# Patient Record
Sex: MALE | Race: WHITE
[De-identification: names, ages, dates, MRNs, and addresses within clinical notes are randomized per-mention and may not be internally consistent; named-entity substitution may affect disease eponyms.]

---

## 2017-01-04 ENCOUNTER — HOSPITAL ENCOUNTER (EMERGENCY)
Dept: HOSPITAL 75 - ER | Age: 22
Discharge: HOME | End: 2017-01-04
Payer: SELF-PAY

## 2017-01-04 VITALS — DIASTOLIC BLOOD PRESSURE: 89 MMHG | SYSTOLIC BLOOD PRESSURE: 145 MMHG

## 2017-01-04 VITALS — HEIGHT: 68 IN | WEIGHT: 135 LBS | BODY MASS INDEX: 20.46 KG/M2
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
## 2017-01-04 DIAGNOSIS — S01.112A: Primary | ICD-10-CM

## 2017-01-04 PROCEDURE — 90715 TDAP VACCINE 7 YRS/> IM: CPT

## 2017-01-04 PROCEDURE — 90471 IMMUNIZATION ADMIN: CPT

## 2017-01-04 PROCEDURE — 12011 RPR F/E/E/N/L/M 2.5 CM/<: CPT

## 2017-01-04 NOTE — ED EENT
History of Present Illness


General


Chief Complaint:  Laceration


Stated Complaint:  LAC ABOVE EYE


Source:  patient


Exam Limitations:  no limitations





History of Present Illness


Time seen by provider:  20:31


Initial Comments


To ER with reports of laceration above the left eye.  States that he took his 

dog outside to go to the bathroom.  Dog was on a leash and the dog took off 

pulling him over.  He did strike the left side of his forehead on the ground.  

There was no loss of consciousness but there is a laceration to the left 

eyebrow.  States that he has had several drinks of vodka tonight.


Timing/Duration:  abrupt


Severity:  moderate


Associated Symptoms:   denies symptoms





Allergies and Home Medications


Allergies


Coded Allergies:  


     No Known Drug Allergies (Unverified , 12/21/14)





Home Medications


Hydrocodone Bit/Acetaminophen 1 Tab Tablet #14 1 TAB PO Q4H PRN PRN PAIN 


   Prescribed by: MONICO CORREA on 12/21/14 1936


Hydrocodone Bit/Acetaminophen 1 Tab Tablet #20 1-2 TAB PO Q6H PRN PRN PAIN 


   Prescribed by: MOSES ARCHER on 3/6/15 0021





Review of Systems


Constitutional:   see HPINo chills


Eyes:   See HPI


Ears:   No Symptoms Reported


Nose:   no symptoms reported


Mouth:   no symptoms reported


Throat:   no symptoms reported


Respiratory:   no symptoms reported


Cardiovascular:   no symptoms reported


Musculoskeletal:   no symptoms reported





Past Medical-Social-Family Hx


Patient Social History


Recent Foreign Travel:  No


Contact w/Someone Who Travel:  No





Surgeries


HX Surgeries:  No





Respiratory


Hx Respiratory Disorders:  No





Cardiovascular


Hx Cardiac Disorders:  No





Neurological


Hx Neurological Disorders:  No





Reproductive System


Hx Reproductive Disorders:  No


Sexually Transmitted Disease:  No





Genitourinary


Hx Genitourinary Disorders:  No





Gastrointestinal


Hx Gastrointestinal Disorders:  No





Musculoskeletal


Hx Musculoskeletal Disorders:  No





Endocrine


Hx Endocrine Disorders:  No





HEENT


HX ENT Disorders:  No





Cancer


Hx Cancer:  No





Psychosocial


Hx Psychiatric Problems:  No





Integumentary


HX Skin/Integumentary Disorder:  No





Blood Transfusions


Hx Blood Disorders:  No





Physical Exam


General Appearance:   WD/WN no apparent distress


Eyes:  left eye other (1 cm laceration to the lateral aspect of the left 

eyebrow.  There is a small abrasion to the left superior aspect of the 

forehead.  Next ocular muscles are intact and there is no subconjunctival 

hemorrhage or evidence of ocular injury.), bilateral eye EOMI, bilateral eye 

PERRL


Ears:  bilateral ear TM normal, bilateral ear auricle normal, bilateral ear 

canal normal


Mouth/Throat:   normal mouth inspection pharynx normal


Neck:   non-tender full range of motion


Respiratory:   normal breath sounds no respiratory distress no accessory muscle 

use


Neurologic/Psychiatric:   alert normal mood/affect oriented x 3


Skin:   normal color warm/dry


His GCS is 15.  He is talks nearly nonstop laughing, conversation is appropriate

, no distress.  Denies headache, denies vision changes denies nausea.  I did 

recommend a CT scan of the head given the alcohol use but he states that the 

cannot afford that and does not feel is necessary given that he has no symptoms 

such as headache nausea or vomiting.  He was then able to repeat the risks 

including "bleeding in or around the brain"back to me at the end of his visit 

and still insists that he does not want or need a CT scan.





Laceration Repair :  


   Wound Location:  Face


   Wound Length (cm):  1


   Wound's Depth, Shape:  sub Q


   Wound Explored:  clean


   Suture:  Prolene


   Suture Size:  5-0


   Number of Sutures:  4


   Layer Closure?:  1


   Number Deep Layer Sutures:  0


Progress


Area anesthetized with 1.5 mL of 1 percent lidocaine with epinephrine.  Wound 

then scrubbed with waxing/saline solution and irrigated with 20 mL of the same.

  Wound then closed with 4 simple interrupted sutures size 5-0 Prolene.





Progress/Results/Core Measures


Results/Orders


My Orders





 Orders-VIOLETTE NAJERA


Lidocaine/Epi 2% 1:100,000 (Xylocaine/Ep (1/4/17 20:15)








Departure


Impression


Impression:  


 Primary Impression:  


 Eyebrow laceration


 Qualified Code:  S01.112A - Laceration without foreign body of left eyelid and 

periocular area, initial encounter


 Additional Impression:  


 Alcohol use


Disposition:  01 HOME, SELF-CARE


Condition:  Stable





Departure-Patient Inst.


Decision time for Depature:  20:35


Referrals:  


NO,LOCAL PHYSICIAN (PCP)


Primary Care Physician


Patient Instructions:  Laceration Repair With Stitches (DC)





Add. Discharge Instructions:


Return to ER in 5-7 days to have the stitches removed at your convenience


Return to ER before then for any sign of head injury such as headache nausea 

vomiting or vision changes and we would need to pursue a CT scan at that point


All discharge instructions reviewed with patient and/or family. Voiced 

understanding.








VIOLETTE NAJERA Jan 4, 2017 20:35

## 2017-03-31 ENCOUNTER — HOSPITAL ENCOUNTER (EMERGENCY)
Dept: HOSPITAL 75 - ER | Age: 22
Discharge: LEFT BEFORE DISCHARGE | End: 2017-03-31
Payer: SELF-PAY

## 2017-03-31 VITALS — BODY MASS INDEX: 20.46 KG/M2 | WEIGHT: 135 LBS | HEIGHT: 68 IN

## 2017-03-31 VITALS — SYSTOLIC BLOOD PRESSURE: 140 MMHG | DIASTOLIC BLOOD PRESSURE: 105 MMHG

## 2017-03-31 DIAGNOSIS — R00.0: Primary | ICD-10-CM

## 2017-03-31 DIAGNOSIS — R11.2: Primary | ICD-10-CM

## 2017-03-31 DIAGNOSIS — Z53.21: ICD-10-CM

## 2017-03-31 DIAGNOSIS — R11.10: ICD-10-CM

## 2017-03-31 PROCEDURE — 99282 EMERGENCY DEPT VISIT SF MDM: CPT

## 2017-03-31 NOTE — ED CARDIAC GENERAL
History of Present Illness


General


Stated Complaint:  VOMITING


Source:  patient


Exam Limitations:  no limitations





History of Present Illness


Time seen by provider:  21:18


Initial Comments


To ER with reports of vomiting all day.  Denies abdominal pain currently or 

nausea and is in fact eating sunflower seeds currently.  Denies diarrhea.  

States that he feels much better now but he needs a school note because he 

missed class.


Severity:  moderate


NTG SL PTA:  No


ASA po PTA:  No


Associated Systoms:  Nausea/Vomiting





Allergies and Home Medications


Allergies


Coded Allergies:  


     No Known Drug Allergies (Unverified , 12/21/14)





Home Medications


No Active Prescriptions or Reported Meds





Review of Systems


Constitutional:  see HPI


EENTM:  No Symptoms Reported


Respiratory:  No Symptoms Reported


Cardiovascular:  No Symptoms Reported


Gastrointestinal:  See HPI, Denies Abdominal Pain, Denies Diarrhea, Nausea, 

Vomiting


Genitourinary:  No Symptoms Reported


Musculoskeletal:  no symptoms reported


Skin:  no symptoms reported


Psychiatric/Neurological:  No Symptoms Reported


Endocrine:  No Symptoms Reported





Past Medical-Social-Family Hx


Patient Social History


Drug of Choice:  MARIJUANA


Type Used:  Cigarettes


Recent Foreign Travel:  No


Contact w/Someone Who Travel:  No


Recent Hopitalizations:  No





Immunizations Up To Date


Tetanus Booster (TDap):  More than 5yrs





Surgeries


HX Surgeries:  No





Respiratory


Hx Respiratory Disorders:  No





Cardiovascular


Hx Cardiac Disorders:  No





Neurological


Hx Neurological Disorders:  No





Reproductive System


Hx Reproductive Disorders:  No


Sexually Transmitted Disease:  No





Genitourinary


Hx Genitourinary Disorders:  No





Gastrointestinal


Hx Gastrointestinal Disorders:  No





Musculoskeletal


Hx Musculoskeletal Disorders:  No





Endocrine


Hx Endocrine Disorders:  No





HEENT


HX ENT Disorders:  No





Cancer


Hx Cancer:  No





Psychosocial


Hx Psychiatric Problems:  No





Integumentary


HX Skin/Integumentary Disorder:  No





Blood Transfusions


Hx Blood Disorders:  No





Physical Exam


Vital Signs





Vital Sign - Last 12Hours








 3/31/17





 21:20


 


Temp 98.9


 


Pulse 144


 


Resp 18


 


B/P (MAP) 140/82


 


Pulse Ox 98


 


O2 Delivery Room Air





Capillary Refill :


General Appearance:  No Apparent Distress, WD/WN, Other (patient is tachycardic 

at a heart rate of 170.  Blood pressure 150/90.  Patient is alert and oriented, 

GCS 15.  He states he does not want an IV or blood draw, all he needs is a note 

for school.  He states that he feels fine and the only reason his heart rate is 

elevated is because he is anxious.  He has no chest pain or shortness of 

breath.  I did advise him of the potential consequences of ignoring this which 

be permanent disability or death.  Patient states "nah, I'm good.  I feel fine.

"  Mentation is normal and he will sign out AGAINST MEDICAL ADVICE and sign a 

refusal of treatment form.)


HEENT:  PERRL/EOMI, TMs Normal, Normal ENT Inspection


Neck:  Full Range of Motion, Normal Inspection


Respiratory:  No Accessory Muscle Use, No Respiratory Distress


Cardiovascular:  No Murmur, Tachycardia


Gastrointestinal:  Normal Bowel Sounds, Non Tender, Soft


Extremity:  Normal Capillary Refill, Normal Inspection


Neurologic/Psychiatric:  Alert, Oriented x3, No Motor/Sensory Deficits


Skin:  Normal Color, Warm/Dry





Laceration Repair :  


   Suture Size:  5-0





Progress/Results/Core Measures


Results/Orders


Vital Signs/I&O





Vital Sign - Last 12Hours








 3/31/17 3/31/17 3/31/17





 21:20 21:25 21:30


 


Temp 98.9  


 


Pulse 144 165 176





  180 





  176 


 


Resp 18  18


 


B/P (MAP) 140/82  


 


Pulse Ox 98  98


 


O2 Delivery Room Air  











Departure


Communication


Progress Notes


When I went back into the room prior to discharge to give the patient his note 

that stated he was here in the emergency room today and would need clearance 

from a physician prior to being released to go back to work or school patient 

states "Man Im getting nervous again" I then asked him why to which he replied 

"I heard that aquiles out there on the phone saying my last name and I thought he 

might be calling the police".  Patient's brother states "they don't need to 

call the police for anything".  While at the registration desk being discharged

, patient state to his brother "Man, I fucked up" but did not elaborate on this.





Impression


Impression:  


 Primary Impression:  


 Tachycardia


 Additional Impression:  


 Left against medical advice


Disposition:  07 AGAINST MEDICAL ADVICE


Condition:  Against Medical Advice





Departure-Patient Inst.


Referrals:  


[facility] (PCP/Family)


Primary Care Physician


Scripts


No Active Prescriptions or Reported Meds











VIOLETTE NAJERA Mar 31, 2017 21:22
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
## 2017-04-23 NOTE — XMS REPORT
Continuity of Care Document

 Created on: 2017



BETTY HUSTON

External Reference #: [id number]

: 1995

Sex: Male



Demographics







 Address  [address] [place]  [address]

 

 Home Phone  [phone]

 

 Preferred Language  Unknown

 

 Marital Status  Unknown

 

 Bahai Affiliation  Unknown

 

 Race  Unknown

 

 Ethnic Group  Unknown





Author







 Author  [facility]

 

 Address  Unknown

 

 Phone  Unavailable



                                                      



Allergies

                      





 Active                    Description                    Code                  
  Type                    Severity                    Reaction                  
  Onset                    Reported/Identified                    Relationship 
to Patient                    Clinical Status                

 

 Yes                    No Known Drug Allergies                    [id number]  
                  Drug Allergy                    Unknown                    N/
A                                         2014                           
                               



                                                                               
         



Medications

                                                                               
         



Problems

                      





 Date Dx Coded                    Attending                    Type            
        Code                    Diagnosis                    Diagnosed By      
          

 

 2010                                                              V01.84
                    MENINGOCOCCAL VACCINE                                     

 

 2010                                                              V06.5 
                   DT, TETANUS-DIPHTHERIA [Td] ,TDAP                           
          

 

 2010                    AMBER GONZALEZ HEBERT LISA                        
                 V01.84                    MENINGOCOCCAL VACCINE               
                      

 

 2010                    AMBER GONZALEZ HEBERT LISA                        
                 V06.5                    DT, TETANUS-DIPHTHERIA [Td] ,TDAP    
                                 

 

 2010                    AMBER GONZALEZ HEBERT LISA                        
                 V01.84                    MENINGOCOCCAL VACCINE               
                      

 

 2010                    AMBER GONZALEZ HEBERT LISA                        
                 V06.5                    DT, TETANUS-DIPHTHERIA [Td] ,TDAP    
                                 

 

 2010                    AMBER GONZALEZ HEBERT LISA                        
                 V01.84                    MENINGOCOCCAL VACCINE               
                      

 

 2010                    AMBER GONZALEZ HEBERT LISA                        
                 V06.5                    DT, TETANUS-DIPHTHERIA [Td] ,TDAP    
                                 

 

 2010                    CHRISE APRN, MARTIN A                          
               V01.84                    MENINGOCOCCAL VACCINE                 
                    

 

 2010                    CHRISE APRALVERTO, MARTIN A                          
               V06.5                    DT, TETANUS-DIPHTHERIA [Td] ,TDAP      
                               

 

 2010                    CHRISE APRN, MARTIN A                          
               V01.84                    MENINGOCOCCAL VACCINE                 
                    

 

 2010                    CHRISE APRALVERTO, MARTIN A                          
               V06.5                    DT, TETANUS-DIPHTHERIA [Td] ,TDAP      
                               

 

 2010                    AMBER GONZALEZ HEBERT LISA                        
                 V01.84                    MENINGOCOCCAL VACCINE               
                      

 

 2010                    AMBER GONZALEZ HEBERT LISA                        
                 V06.5                    DT, TETANUS-DIPHTHERIA [Td] ,TDAP    
                                 

 

 2010                    AMBER GONZALEZ HEBERT LISA                        
                 V01.84                    MENINGOCOCCAL VACCINE               
                      

 

 2010                    AMBER SOARESALVERTO, HEBERT GONZALEZ                        
                 V06.5                    DT, TETANUS-DIPHTHERIA [Td] ,TDAP    
                                 

 

 2010                    RUFUS APRN, FAWAD R                           
              V01.84                    MENINGOCOCCAL VACCINE                  
                   

 

 2010                    RUFUS APRN, FAWAD R                           
              V06.5                    DT, TETANUS-DIPHTHERIA [Td] ,TDAP       
                              

 

 10/25/2010                                                              313.81
                    CD OPPOSITIONAL DEFIANT                                     

 

 10/25/2010                                                              314.01
                    ADHD COMBINED                                     

 

 10/25/2010                    AMBER SOARESALVERTO, HEBERT GONZALEZ                        
                 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             
                        

 

 10/25/2010                    CLARK APRN, HEBERT GONZALEZ                        
                 314.01                    ADHD COMBINED                       
              

 

 10/25/2010                    CLARK APRN, HEBERT GONZALEZ                        
                 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             
                        

 

 10/25/2010                    AMBER SOARESN, HEBERT GONZALEZ                        
                 314.01                    ADHD COMBINED                       
              

 

 10/25/2010                    CLARK APRN, HEBERT GONZALEZ                        
                 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             
                        

 

 10/25/2010                    AMBER SOARESALVERTO HEBERT GONZALEZ                        
                 314.01                    ADHD COMBINED                       
              

 

 10/25/2010                    DIRKOTTE APRN, MARTIN A                          
               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               
                      

 

 10/25/2010                    RAJOTTE APRN, MARTIN A                          
               314.01                    ADHD COMBINED                         
            

 

 10/25/2010                    RAJOTTE APRN, MARTIN A                          
               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               
                      

 

 10/25/2010                    DIRKOTTE APRN, MARTIN A                          
               314.01                    ADHD COMBINED                         
            

 

 10/25/2010                    CLARK APRN, HEBERT GONZALEZ                        
                 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             
                        

 

 10/25/2010                    AMBER SOARESALVERTO, HEBERT GONZALEZ                        
                 314.01                    ADHD COMBINED                       
              

 

 10/25/2010                    CLARK APRN, HEBERT GONZALEZ                        
                 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             
                        

 

 10/25/2010                    CLARK APRN, HEBERT GONZALEZ                        
                 314.01                    ADHD COMBINED                       
              

 

 10/25/2010                    RUFUS APRN, FAWAD R                           
              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                
                     

 

 10/25/2010                    RUFUS APRN, FAWAD R                           
              314.01                    ADHD COMBINED                          
           

 

 2010                                                              296.90
                    MO MOOD DIS NOS                                     

 

 2010                    AMBER GONZALEZ HEBERT HOOPERH                        
                 296.90                    MO MOOD DIS NOS                     
                

 

 2010                    AMBER GONZALEZ HEBERT LISA                        
                 296.90                    MO MOOD DIS NOS                     
                

 

 2010                    AMBER GONZALEZ HEBERT LISA                        
                 296.90                    MO MOOD DIS NOS                     
                

 

 2010                    RAJOTTE APRN, MARTIN A                          
               296.90                    MO MOOD DIS NOS                       
              

 

 2010                    DIRKOTTE APRN, MARTIN A                          
               296.90                    MO MOOD DIS NOS                       
              

 

 2010                    AMBER APRN, HEBERT GONZALEZ                        
                 296.90                    MO MOOD DIS NOS                     
                

 

 2010                    CLARK APRN, HEBERT GONZALEZ                        
                 296.90                    MO MOOD DIS NOS                     
                

 

 2010                    RUFUS APRN, FAWAD R                           
              296.90                    MO MOOD DIS NOS                        
             

 

 2012                    CLARK APRN, HEBERT GONZALEZ                        
                 307.3                    TIC DISORDER                         
            

 

 2012                    CLARK APRN, HEBERT GONZALEZ                        
                 V58.69                    MEDICATION HIGH RISK                
                     

 

 2012                    CLARK APRN, HEBERT GONZALEZ                        
                 307.3                    TIC DISORDER                         
            

 

 2012                    CLARK APRN, HEBERT GONZALEZ                        
                 V58.69                    MEDICATION HIGH RISK                
                     

 

 2012                    CLARK APRN, HEBERT GONZALEZ                        
                 307.3                    TIC DISORDER                         
            

 

 2012                    CLARK APRN, HEBERT GONZALEZ                        
                 V58.69                    MEDICATION HIGH RISK                
                     

 

 2012                    CHRISE APRN, MARTIN A                          
               307.3                    TIC DISORDER                           
          

 

 2012                    RAJOTTE APRN, MARTIN A                          
               V58.69                    MEDICATION HIGH RISK                  
                   

 

 2012                    RAJOTTE APRN, MARTIN A                          
               307.3                    TIC DISORDER                           
          

 

 2012                    RAJOTTE APRN, MARTIN A                          
               V58.69                    MEDICATION HIGH RISK                  
                   

 

 2012                    AMBER SOARESN, HEBERT GONZALEZ                        
                 307.3                    TIC DISORDER                         
            

 

 2012                    CLARK APRN, HEBERT GONZALEZ                        
                 V58.69                    MEDICATION HIGH RISK                
                     

 

 2012                    CLARK APRN, HEBERT GONZALEZ                        
                 307.3                    TIC DISORDER                         
            

 

 2012                    CLARK APRN, HEBERT GONZALEZ                        
                 V58.69                    MEDICATION HIGH RISK                
                     

 

 2012                    RUFUS APRN, FAWAD R                           
              307.3                    TIC DISORDER                            
         

 

 2012                    RUFUS APRN, FAWAD R                           
              V58.69                    MEDICATION HIGH RISK                   
                  

 

 10/30/2013                    DIRKOTTE APRN, MARTIN A                          
               V70.3                    SPORTS PHYSICAL                        
             

 

 10/30/2013                    RAJOTTE APRN, MARTIN A                          
               V70.3                    SPORTS PHYSICAL                        
             

 

 10/30/2013                    CLARK APRN, HEBERT GONZALEZ                        
                 V70.3                    SPORTS PHYSICAL                      
               

 

 10/30/2013                    CLARK APRN, HEBERT GONZALEZ                        
                 V70.3                    SPORTS PHYSICAL                      
               

 

 10/30/2013                    [place]AD [place]ACH                           
              V70.3                    SPORTS PHYSICAL                         
            

 

 2014                    FA[place]AD [place]ACH                           
              E920.5                    ACCIDENT - NEEDLESTICK                 
                    

 

 2014                    [facility]                    Ot     
               826.0                    FX PHALANX, FOOT-CLOSED                
                     

 

 2014                    [facility] [facility]                    Ot     
               959.7                    LOWER LEG INJURY NOS                   
                  

 

 2014                    [facility] [facility]                    Ot     
               E000.8                    OTHER EXTERNAL CAUSE STATUS           
                          

 

 2014                    [facility] [facility]                    Ot     
               E928.9                    ACCIDENT NOS                          
           

 

 2015                    BLANCHO DPM, LALY CLANCY                    Ot      
              825.25                                                          

 

 2015                    BLANCHO DPM, LALY CLANCY                    Ot      
              826.0                                                          

 

 2015                    BLANCHO DPM, LALY CLANCY                    Ot      
              E000.8                                                          

 

 2015                    BLANCHO DPM, LALY CLANCY                    Ot      
              E008.1                                                          

 

 2015                    BLANCHO DPM, LALY CLANCY                    Ot      
              E927.0                                                          

 

 2015                                         Ot                    
305.00                    ALCOHOL ABUSE-UNSPEC                                 
    

 

 2015                                         Ot                    
719.41                    JOINT PAIN-SHLDER                                     

 

 2015                                         Ot                    
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                              
       

 

 2015                                         Ot                    
E029.2                    ROUGH HOUSING AND HORSEPLAY                          
           

 

 2015                                         Ot                    
E928.9                    ACCIDENT NOS                                     

 

 2016                    BLANCHO DPM, LALY CLANCY                    Ot      
              825.25                    FX METATARSAL-CLOSED                   
                  

 

 2016                    BLANCHO DPM, LALY CLANCY                    Ot      
              826.0                    FX PHALANX, FOOT-CLOSED                 
                    

 

 2016                    BLANCHO DPM, LALY CLANCY                    Ot      
              E000.8                    OTHER EXTERNAL CAUSE STATUS            
                         

 

 2016                    BLANCHO DPM, LALY CLANCY                    Ot      
              E008.1                    ACTIVITIES INVOLVING WRESTLING         
                            

 

 2016                    BLANCHO DPM, LALY CLANCY                    Ot      
              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 
MOVEM                                     

 

 2016                    BLANCHO DPM, LALY CLANCY                    Ot      
              825.25                    FX METATARSAL-CLOSED                   
                  

 

 2016                    BLANCHO DPM, LALY CLANCY                    Ot      
              826.0                    FX PHALANX, FOOT-CLOSED                 
                    

 

 2016                    BLANCHO DPM, LALY CLANCY                    Ot      
              E000.8                    OTHER EXTERNAL CAUSE STATUS            
                         

 

 2016                    BLANCHO DPM, LALY CLANCY                    Ot      
              E008.1                    ACTIVITIES INVOLVING WRESTLING         
                            

 

 2016                    BLANCHO DPM, LALY CLANCY                    Ot      
              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 
MOVEM                                     

 

 2016                    BLANCHO DPM, LALY CLANCY                    Ot      
              825.25                    FX METATARSAL-CLOSED                   
                  

 

 2016                    BLANCHO DPM, LALY CLANCY                    Ot      
              826.0                    FX PHALANX, FOOT-CLOSED                 
                    

 

 2016                    BLANCHO DPM, LALY CLANCY                    Ot      
              E000.8                    OTHER EXTERNAL CAUSE STATUS            
                         

 

 2016                    BLANCHO DPM, LALY CLANCY                    Ot      
              E008.1                    ACTIVITIES INVOLVING WRESTLING         
                            

 

 2016                    BLANCHO DPM, LALY CLANCY                    Ot      
              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 
MOVEM                                     

 

 2016                    BLANCHO DPM, LALY CLANCY                    Ot      
              825.25                    FX METATARSAL-CLOSED                   
                  

 

 2016                    BLANCHO DPM, LALY LCANCY                    Ot      
              826.0                    FX PHALANX, FOOT-CLOSED                 
                    

 

 2016                    BLANCHO DPM, LALY CLANCY                    Ot      
              E000.8                    OTHER EXTERNAL CAUSE STATUS            
                         

 

 2016                    BLANCHO DPM, LALY CLANCY                    Ot      
              E008.1                    ACTIVITIES INVOLVING WRESTLING         
                            

 

 2016                    BLANCHO DPM, LALY CLANCY                    Ot      
              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 
MOVEM                                     

 

 2016                    BLANCHO DPM, LALY CLANCY                    Ot      
              825.25                    FX METATARSAL-CLOSED                   
                  

 

 2016                    BLANCHO DPM, LALY CLANCY                    Ot      
              826.0                    FX PHALANX, FOOT-CLOSED                 
                    

 

 2016                    BLANCHO DPM, LALY CLANCY                    Ot      
              E000.8                    OTHER EXTERNAL CAUSE STATUS            
                         

 

 2016                    BLANCHO DPM, LALY CLANCY                    Ot      
              E008.1                    ACTIVITIES INVOLVING WRESTLING         
                            

 

 2016                    BLANCHO DPM, LALY CLANCY                    Ot      
              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 
MOVEM                                     

 

 2016                    [facility] [facility]                    Ot     
               826.0                    FX PHALANX, FOOT-CLOSED                
                     

 

 2016                    [facility] [facility]                    Ot     
               959.7                    LOWER LEG INJURY NOS                   
                  

 

 2016                    [facility] [facility]                    Ot     
               E000.8                    OTHER EXTERNAL CAUSE STATUS           
                          

 

 2016                    [facility] [facility]                    Ot     
               E928.9                    ACCIDENT NOS                          
           

 

 2017                    BLANCHO DPM, LALY CLANCY                    Ot      
              825.25                    FX METATARSAL-CLOSED                   
                  

 

 2017                    BLANCHO DPM, LALY CLANCY                    Ot      
              826.0                    FX PHALANX, FOOT-CLOSED                 
                    

 

 2017                    BLANCHO DPM, LALY CLANCY                    Ot      
              E000.8                    OTHER EXTERNAL CAUSE STATUS            
                         

 

 2017                    BLANCHO DPM, LALY CLANCY                    Ot      
              E008.1                    ACTIVITIES INVOLVING WRESTLING         
                            

 

 2017                    BLANCHO DPM, LALY CLANCY                    Ot      
              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 
MOVEM                                     

 

 2017                    VIOLETTE NAJERA                    Ot       
             S01.112A                    LACERATION W/O FB OF LEFT EYELID AND 
PER                                     

 

 2017                    VIOLETTE NAJERA                    Ot       
             W01.0XXA                    FALL SAME LEV FROM SLIP/TRIP W/O 
STRIKE                                      

 

 2017                    VIOLETTE NAJERA                    Ot       
             Y92.017                    GARDEN OR YARD IN SINGLE-FAMILY (
PRIVATE                                     

 

 2017                    VIOLETTE NAJERA                    Ot       
             Y93.K1                    ACTIVITY, WALKING AN ANIMAL             
                        

 

 2017                    VIOLETTE NAJERA                    Ot       
             Y99.8                    OTHER EXTERNAL CAUSE STATUS              
                       

 

 2017                    VIOLETTE NAJERA                    Ot       
             S01.112A                    LACERATION W/O FB OF LEFT EYELID AND 
PER                                     

 

 2017                    VIOLETTE NAJERA                    Ot       
             W01.0XXA                    FALL SAME LEV FROM SLIP/TRIP W/O 
STRIKE                                      

 

 2017                    VIOLETTE NAJERA                    Ot       
             Y92.017                    GARDEN OR YARD IN SINGLE-FAMILY (
PRIVATE                                     

 

 2017                    VIOLETTE NAJERA                    Ot       
             Y93.K1                    ACTIVITY, WALKING AN ANIMAL             
                        

 

 2017                    VIOLETTE NAJERA                    Ot       
             Y99.8                    OTHER EXTERNAL CAUSE STATUS              
                       

 

 2017                    MOSES ADAIR MD                    Ot  
                  R11.2                    NAUSEA WITH VOMITING, UNSPECIFIED   
                                  

 

 2017                    MOSES ADAIR MD                    Ot  
                  Z53.21                    PROC/TRTMT NOT CRD OUT D/T PT LV 
BEF SEE                                     

 

 2017                    MOSES ADAIR MD                    Ot  
                  R11.2                    NAUSEA WITH VOMITING, UNSPECIFIED   
                                  

 

 2017                    MOSES ADAIR MD                    Ot  
                  Z53.21                    PROC/TRTMT NOT CRD OUT D/T PT LV 
BEF SEE                                     

 

 2017                    NAJERA, PETER J APRN                    Ot       
             R00.0                    TACHYCARDIA, UNSPECIFIED                 
                    

 

 2017                    NAJERAVIOLETTE                    Ot       
             R11.10                    VOMITING, UNSPECIFIED                   
                  



                                                                               
                                                                               
                                                                               
                                                                               
                                                                               
                                                                               
                                                                               
                                                                               
                                                                               
                                                                               
                                                                               
                                                                               
                                                                               
                                                                               
                                                                               
                                                                               
                                                                               
                                             



Procedures

                      





 Code                    Description                    Performed By           
         Performed On                

 

                     [id number]                                                     
     ROUTINE VENIPUNCTURE                                                      
     2013                

 

                     26902                                                     
     CBC                                                           2013  
              

 

                     80882                                                     
     CMP                                                           2013  
              

 

                     32947                                                     
     PSYCH IND W/MED CK 20                                                     
      2013                

 

                     71951                                                     
     VISUAL ACUITY SCREEN                                                      
     10/30/2013                

 

                     21211                                                     
     VISUAL ACUITY SCREEN                                                      
     2013                

 

                     [id number]                                                     
     ROUTINE VENIPUNCTURE                                                      
     2014                

 

                     12613                                                     
     HEP B SURFACE ANTIGEN (RML)                                               
            2014                

 

                     45195                                                     
     HEP C ANTIBODY (RML)                                                      
     2014                

 

                     92155                                                     
     HIV ANTIBODIES (RML)                                                      
     2014                



                                                                               
                                                                               
                    



Results

                                                                    



Encounters

                      





 ACCT No.                    Visit Date/Time                    Discharge      
              Status                    Pt. Type                    Provider   
                 Facility                    Loc./Unit                    
Complaint                

 

 [id number]                    2014 15:11:00                    2014 23:
59:59                    CLS                    Outpatient                    
[facility]                                                          
                     

 

 [id number]                    2014 16:35:00                    2014 23:
59:59                    CLS                    Outpatient                    
[facility]                                                       
                        

 

 [id number]                    2013 16:28:00                    2013 23:
59:59                    CLS                    Outpatient                    
[facility]                                                       
                        

 

 [id number]                    2013 14:38:00                    2013 23:
59:59                    CLS                    Outpatient                    
[facility]                                                         
                      

 

 [id number]                    10/30/2013 14:31:00                    10/30/2013 23:
59:59                    CLS                    Outpatient                    
[facility]                                                         
                      

 

 [id number]                    2013 16:06:00                    2013 23:
59:59                    CLS                    Outpatient                    
[facility]                                                       
                        

 

 [id number]                    2013 15:52:00                    2013 23:
59:59                    CLS                    Outpatient                    
[facility]                                                       
                        

 

 [id number]                    2013 12:45:00                    2013 23:
59:59                    CLS                    Outpatient                    
[facility]                                                       
                        

 

 [id number]                    2012 15:26:00                    2012 23:
59:59                    CLS                    Outpatient

## 2017-04-23 NOTE — XMS REPORT
Continuity of Care Document

 Created on: 2017



BETTY HUSTON

External Reference #: [id number]

: 1995

Sex: Male



Demographics







 Address  [address] [place]  [address]

 

 Home Phone  [phone]

 

 Preferred Language  Unknown

 

 Marital Status  Unknown

 

 Anabaptism Affiliation  Unknown

 

 Race  Unknown

 

 Ethnic Group  Unknown





Author







 Author  [facility]

 

 Address  Unknown

 

 Phone  Unavailable



                                                      



Allergies

                      





 Active                    Description                    Code                  
  Type                    Severity                    Reaction                  
  Onset                    Reported/Identified                    Relationship 
to Patient                    Clinical Status                

 

 Yes                    No Known Drug Allergies                    [id number]  
                  Drug Allergy                    Unknown                    N/
A                                         2014                           
                               



                                                                               
         



Medications

                                                                               
         



Problems

                      





 Date Dx Coded                    Attending                    Type            
        Code                    Diagnosis                    Diagnosed By      
          

 

 2010                                                              V01.84
                    MENINGOCOCCAL VACCINE                                     

 

 2010                                                              V06.5 
                   DT, TETANUS-DIPHTHERIA [Td] ,TDAP                           
          

 

 2010                    AMBER GONZALEZ HEBERT LISA                        
                 V01.84                    MENINGOCOCCAL VACCINE               
                      

 

 2010                    AMBER GONZALEZ HEBERT LISA                        
                 V06.5                    DT, TETANUS-DIPHTHERIA [Td] ,TDAP    
                                 

 

 2010                    AMBER GONZALEZ HEBERT LISA                        
                 V01.84                    MENINGOCOCCAL VACCINE               
                      

 

 2010                    AMBER GONZALEZ HEBERT LISA                        
                 V06.5                    DT, TETANUS-DIPHTHERIA [Td] ,TDAP    
                                 

 

 2010                    AMBER GONZALEZ HEBERT LISA                        
                 V01.84                    MENINGOCOCCAL VACCINE               
                      

 

 2010                    AMBER GONZALEZ HEBERT LISA                        
                 V06.5                    DT, TETANUS-DIPHTHERIA [Td] ,TDAP    
                                 

 

 2010                    CHRISE APRN, MARTIN A                          
               V01.84                    MENINGOCOCCAL VACCINE                 
                    

 

 2010                    CHRISE APRALVERTO, MARTIN A                          
               V06.5                    DT, TETANUS-DIPHTHERIA [Td] ,TDAP      
                               

 

 2010                    CHRISE APRN, MARTIN A                          
               V01.84                    MENINGOCOCCAL VACCINE                 
                    

 

 2010                    CHRISE APRALVERTO, MARTIN A                          
               V06.5                    DT, TETANUS-DIPHTHERIA [Td] ,TDAP      
                               

 

 2010                    AMBER GONZALEZ HEBERT LISA                        
                 V01.84                    MENINGOCOCCAL VACCINE               
                      

 

 2010                    AMBER GONZALEZ HEBERT LISA                        
                 V06.5                    DT, TETANUS-DIPHTHERIA [Td] ,TDAP    
                                 

 

 2010                    AMBER GONZALEZ HEBERT LISA                        
                 V01.84                    MENINGOCOCCAL VACCINE               
                      

 

 2010                    AMBER SOARESALVERTO, HEBERT GONZALEZ                        
                 V06.5                    DT, TETANUS-DIPHTHERIA [Td] ,TDAP    
                                 

 

 2010                    RUFUS APRN, FAWAD R                           
              V01.84                    MENINGOCOCCAL VACCINE                  
                   

 

 2010                    RUFUS APRN, FAWAD R                           
              V06.5                    DT, TETANUS-DIPHTHERIA [Td] ,TDAP       
                              

 

 10/25/2010                                                              313.81
                    CD OPPOSITIONAL DEFIANT                                     

 

 10/25/2010                                                              314.01
                    ADHD COMBINED                                     

 

 10/25/2010                    AMBER SOARESALVERTO, HEBERT GONZALEZ                        
                 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             
                        

 

 10/25/2010                    CLARK APRN, HEBERT GONZALEZ                        
                 314.01                    ADHD COMBINED                       
              

 

 10/25/2010                    CLARK APRN, HEBERT GONZALEZ                        
                 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             
                        

 

 10/25/2010                    AMBER SOARESN, HEBERT GONZALEZ                        
                 314.01                    ADHD COMBINED                       
              

 

 10/25/2010                    CLARK APRN, HEBERT GONZALEZ                        
                 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             
                        

 

 10/25/2010                    AMBER SOARESALVERTO HEBERT GONZALEZ                        
                 314.01                    ADHD COMBINED                       
              

 

 10/25/2010                    DIRKOTTE APRN, MARTIN A                          
               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               
                      

 

 10/25/2010                    RAJOTTE APRN, MARTIN A                          
               314.01                    ADHD COMBINED                         
            

 

 10/25/2010                    RAJOTTE APRN, MARTIN A                          
               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               
                      

 

 10/25/2010                    DIRKOTTE APRN, MARTIN A                          
               314.01                    ADHD COMBINED                         
            

 

 10/25/2010                    CLARK APRN, HEBERT GONZALEZ                        
                 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             
                        

 

 10/25/2010                    AMBER SOARESALVERTO, HEBERT GONZALEZ                        
                 314.01                    ADHD COMBINED                       
              

 

 10/25/2010                    CLARK APRN, HEBERT GONZALEZ                        
                 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             
                        

 

 10/25/2010                    CLARK APRN, HEBERT GONZALEZ                        
                 314.01                    ADHD COMBINED                       
              

 

 10/25/2010                    RUFUS APRN, FWAAD R                           
              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                
                     

 

 10/25/2010                    RUFUS APRN, FAWAD R                           
              314.01                    ADHD COMBINED                          
           

 

 2010                                                              296.90
                    MO MOOD DIS NOS                                     

 

 2010                    AMBER GONZALEZ HEBERT HOOPERH                        
                 296.90                    MO MOOD DIS NOS                     
                

 

 2010                    AMBER GONZALEZ HEBERT LISA                        
                 296.90                    MO MOOD DIS NOS                     
                

 

 2010                    AMBER GONZALEZ HEBERT LISA                        
                 296.90                    MO MOOD DIS NOS                     
                

 

 2010                    RAJOTTE APRN, MARTIN A                          
               296.90                    MO MOOD DIS NOS                       
              

 

 2010                    DIRKOTTE APRN, MARTIN A                          
               296.90                    MO MOOD DIS NOS                       
              

 

 2010                    AMBER APRN, HEBERT GONZALEZ                        
                 296.90                    MO MOOD DIS NOS                     
                

 

 2010                    CLARK APRN, HEBERT GONZALEZ                        
                 296.90                    MO MOOD DIS NOS                     
                

 

 2010                    RUFUS APRN, FAWAD R                           
              296.90                    MO MOOD DIS NOS                        
             

 

 2012                    CLARK APRN, HEBERT GONZALEZ                        
                 307.3                    TIC DISORDER                         
            

 

 2012                    CLARK APRN, HEBERT GONZALEZ                        
                 V58.69                    MEDICATION HIGH RISK                
                     

 

 2012                    CLARK APRN, HEBERT GONZALEZ                        
                 307.3                    TIC DISORDER                         
            

 

 2012                    CLARK APRN, HEBERT GONZALEZ                        
                 V58.69                    MEDICATION HIGH RISK                
                     

 

 2012                    CLARK APRN, HEBERT GONZALEZ                        
                 307.3                    TIC DISORDER                         
            

 

 2012                    CLARK APRN, HEBERT GONZALEZ                        
                 V58.69                    MEDICATION HIGH RISK                
                     

 

 2012                    CHRISE APRN, MARTIN A                          
               307.3                    TIC DISORDER                           
          

 

 2012                    RAJOTTE APRN, MARTIN A                          
               V58.69                    MEDICATION HIGH RISK                  
                   

 

 2012                    RAJOTTE APRN, MARTIN A                          
               307.3                    TIC DISORDER                           
          

 

 2012                    RAJOTTE APRN, MARTIN A                          
               V58.69                    MEDICATION HIGH RISK                  
                   

 

 2012                    AMBER SOARESN, HEBERT GONZALEZ                        
                 307.3                    TIC DISORDER                         
            

 

 2012                    CLRAK APRN, HEBERT GONZALEZ                        
                 V58.69                    MEDICATION HIGH RISK                
                     

 

 2012                    CLARK APRN, HEBERT GONZALEZ                        
                 307.3                    TIC DISORDER                         
            

 

 2012                    CLARK APRN, HEBERT GONZALEZ                        
                 V58.69                    MEDICATION HIGH RISK                
                     

 

 2012                    RUFUS APRN, FAWAD R                           
              307.3                    TIC DISORDER                            
         

 

 2012                    RUFUS APRN, FAWAD R                           
              V58.69                    MEDICATION HIGH RISK                   
                  

 

 10/30/2013                    DIRKOTTE APRN, MARTIN A                          
               V70.3                    SPORTS PHYSICAL                        
             

 

 10/30/2013                    RAJOTTE APRN, MARTIN A                          
               V70.3                    SPORTS PHYSICAL                        
             

 

 10/30/2013                    CLARK APRN, HEBERT GONZALEZ                        
                 V70.3                    SPORTS PHYSICAL                      
               

 

 10/30/2013                    CLARK APRN, HEBERT GONZALEZ                        
                 V70.3                    SPORTS PHYSICAL                      
               

 

 10/30/2013                    FAWAD BEACH                           
              V70.3                    SPORTS PHYSICAL                         
            

 

 2014                    FAWAD BEACH                           
              E920.5                    ACCIDENT - NEEDLESTICK                 
                    

 

 2014                    [facility]                    Ot     
               826.0                    FX PHALANX, FOOT-CLOSED                
                     

 

 2014                    [facility] [facility]                    Ot     
               959.7                    LOWER LEG INJURY NOS                   
                  

 

 2014                    [facility] [facility]                    Ot     
               E000.8                    OTHER EXTERNAL CAUSE STATUS           
                          

 

 2014                    [facility] [facility]                    Ot     
               E928.9                    ACCIDENT NOS                          
           

 

 2015                    BLANCHO DPM, LALY CLANCY                    Ot      
              825.25                                                          

 

 2015                    BLANCHO DPM, LALY CLANCY                    Ot      
              826.0                                                          

 

 2015                    BLANCHO DPM, LALY CLANCY                    Ot      
              E000.8                                                          

 

 2015                    BLANCHO DPM, LALY CLANCY                    Ot      
              E008.1                                                          

 

 2015                    BLANCHO DPM, LALY CLANCY                    Ot      
              E927.0                                                          

 

 2015                                         Ot                    
305.00                    ALCOHOL ABUSE-UNSPEC                                 
    

 

 2015                                         Ot                    
719.41                    JOINT PAIN-SHLDER                                     

 

 2015                                         Ot                    
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                              
       

 

 2015                                         Ot                    
E029.2                    ROUGH HOUSING AND HORSEPLAY                          
           

 

 2015                                         Ot                    
E928.9                    ACCIDENT NOS                                     

 

 2016                    BLANCHO DPM, LALY CLANCY                    Ot      
              825.25                    FX METATARSAL-CLOSED                   
                  

 

 2016                    BLANCHO DPM, LALY CLANCY                    Ot      
              826.0                    FX PHALANX, FOOT-CLOSED                 
                    

 

 2016                    BLANCHO DPM, LALY CLANCY                    Ot      
              E000.8                    OTHER EXTERNAL CAUSE STATUS            
                         

 

 2016                    BLANCHO DPM, LALY CLANCY                    Ot      
              E008.1                    ACTIVITIES INVOLVING WRESTLING         
                            

 

 2016                    BLANCHO DPM, LALY CLANCY                    Ot      
              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 
MOVEM                                     

 

 2016                    BLANCHO DPM, LALY CLANCY                    Ot      
              825.25                    FX METATARSAL-CLOSED                   
                  

 

 2016                    BLANCHO DPM, LALY CLANCY                    Ot      
              826.0                    FX PHALANX, FOOT-CLOSED                 
                    

 

 2016                    BLANCHO DPM, LALY CLANCY                    Ot      
              E000.8                    OTHER EXTERNAL CAUSE STATUS            
                         

 

 2016                    BLANCHO DPM, LALY CLANCY                    Ot      
              E008.1                    ACTIVITIES INVOLVING WRESTLING         
                            

 

 2016                    BLANCHO DPM, LALY CLANCY                    Ot      
              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 
MOVEM                                     

 

 2016                    BLANCHO DPM, LALY CLANCY                    Ot      
              825.25                    FX METATARSAL-CLOSED                   
                  

 

 2016                    BLANCHO DPM, LALY CLANCY                    Ot      
              826.0                    FX PHALANX, FOOT-CLOSED                 
                    

 

 2016                    BLANCHO DPM, LALY CLANCY                    Ot      
              E000.8                    OTHER EXTERNAL CAUSE STATUS            
                         

 

 2016                    BLANCHO DPM, LALY CLANCY                    Ot      
              E008.1                    ACTIVITIES INVOLVING WRESTLING         
                            

 

 2016                    BLANCHO DPM, LALY CLANCY                    Ot      
              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 
MOVEM                                     

 

 2016                    BLANCHO DPM, LALY CLANCY                    Ot      
              825.25                    FX METATARSAL-CLOSED                   
                  

 

 2016                    BLANCHO DPM, LALY CLANCY                    Ot      
              826.0                    FX PHALANX, FOOT-CLOSED                 
                    

 

 2016                    BLANCHO DPM, LALY CLANCY                    Ot      
              E000.8                    OTHER EXTERNAL CAUSE STATUS            
                         

 

 2016                    BLANCHO DPM, LALY CLANCY                    Ot      
              E008.1                    ACTIVITIES INVOLVING WRESTLING         
                            

 

 2016                    BLANCHO DPM, LALY CLANCY                    Ot      
              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 
MOVEM                                     

 

 2016                    BLANCHO DPM, LALY CLANCY                    Ot      
              825.25                    FX METATARSAL-CLOSED                   
                  

 

 2016                    BLANCHO DPM, LALY CLANCY                    Ot      
              826.0                    FX PHALANX, FOOT-CLOSED                 
                    

 

 2016                    BLANCHO DPM, LALY CLANCY                    Ot      
              E000.8                    OTHER EXTERNAL CAUSE STATUS            
                         

 

 2016                    BLANCHO DPM, LALY CLANCY                    Ot      
              E008.1                    ACTIVITIES INVOLVING WRESTLING         
                            

 

 2016                    BLANCHO DPM, LALY CLANCY                    Ot      
              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 
MOVEM                                     

 

 2016                    [facility] [facility]                    Ot     
               826.0                    FX PHALANX, FOOT-CLOSED                
                     

 

 2016                    [facility] [facility]                    Ot     
               959.7                    LOWER LEG INJURY NOS                   
                  

 

 2016                    [facility] [facility]                    Ot     
               E000.8                    OTHER EXTERNAL CAUSE STATUS           
                          

 

 2016                    [facility] [facility]                    Ot     
               E928.9                    ACCIDENT NOS                          
           

 

 2017                    BLANCHO DPM, LALY CLANCY                    Ot      
              825.25                    FX METATARSAL-CLOSED                   
                  

 

 2017                    BLANCHO DPM, LALY CLANCY                    Ot      
              826.0                    FX PHALANX, FOOT-CLOSED                 
                    

 

 2017                    BLANCHO DPM, LALY CLANCY                    Ot      
              E000.8                    OTHER EXTERNAL CAUSE STATUS            
                         

 

 2017                    BLANCHO DPM, LALY CLANCY                    Ot      
              E008.1                    ACTIVITIES INVOLVING WRESTLING         
                            

 

 2017                    BLANCHO DPM, LALY CLANCY                    Ot      
              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 
MOVEM                                     

 

 2017                    VIOLETTE NAJERA                    Ot       
             S01.112A                    LACERATION W/O FB OF LEFT EYELID AND 
PER                                     

 

 2017                    VIOLETTE NAJERA                    Ot       
             W01.0XXA                    FALL SAME LEV FROM SLIP/TRIP W/O 
STRIKE                                      

 

 2017                    VIOLETTE NAJERA                    Ot       
             Y92.017                    GARDEN OR YARD IN SINGLE-FAMILY (
PRIVATE                                     

 

 2017                    VIOLETTE NAJERA                    Ot       
             Y93.K1                    ACTIVITY, WALKING AN ANIMAL             
                        

 

 2017                    VIOLETTE NAJERA                    Ot       
             Y99.8                    OTHER EXTERNAL CAUSE STATUS              
                       

 

 2017                    VIOLETTE NAJERA                    Ot       
             S01.112A                    LACERATION W/O FB OF LEFT EYELID AND 
PER                                     

 

 2017                    VIOLETTE NAJERA                    Ot       
             W01.0XXA                    FALL SAME LEV FROM SLIP/TRIP W/O 
STRIKE                                      

 

 2017                    VIOLETTE NAJERA                    Ot       
             Y92.017                    GARDEN OR YARD IN SINGLE-FAMILY (
PRIVATE                                     

 

 2017                    VIOLETTE NAJERA                    Ot       
             Y93.K1                    ACTIVITY, WALKING AN ANIMAL             
                        

 

 2017                    VIOLETTE NAJERA                    Ot       
             Y99.8                    OTHER EXTERNAL CAUSE STATUS              
                       

 

 2017                    MOSES ADAIR MD                    Ot  
                  R11.2                    NAUSEA WITH VOMITING, UNSPECIFIED   
                                  

 

 2017                    MOSES ADAIR MD                    Ot  
                  Z53.21                    PROC/TRTMT NOT CRD OUT D/T PT LV 
BEF SEE                                     

 

 2017                    MOSES ADAIR MD                    Ot  
                  R11.2                    NAUSEA WITH VOMITING, UNSPECIFIED   
                                  

 

 2017                    MOSES ADAIR MD                    Ot  
                  Z53.21                    PROC/TRTMT NOT CRD OUT D/T PT LV 
BEF SEE                                     

 

 2017                    NAJERA, PETER J APRN                    Ot       
             R00.0                    TACHYCARDIA, UNSPECIFIED                 
                    

 

 2017                    NAJERAVIOLETTE                    Ot       
             R11.10                    VOMITING, UNSPECIFIED                   
                  



                                                                               
                                                                               
                                                                               
                                                                               
                                                                               
                                                                               
                                                                               
                                                                               
                                                                               
                                                                               
                                                                               
                                                                               
                                                                               
                                                                               
                                                                               
                                                                               
                                                                               
                                             



Procedures

                      





 Code                    Description                    Performed By           
         Performed On                

 

                     [id number]                                                     
     ROUTINE VENIPUNCTURE                                                      
     2013                

 

                     [id number]                                                     
     CBC                                                           2013  
              

 

                     [id number]                                                     
     CMP                                                           2013  
              

 

                     25704                                                     
     PSYCH IND W/MED CK 20                                                     
      2013                

 

                     64174                                                     
     VISUAL ACUITY SCREEN                                                      
     10/30/2013                

 

                     86425                                                     
     VISUAL ACUITY SCREEN                                                      
     2013                

 

                     [id number]                                                     
     ROUTINE VENIPUNCTURE                                                      
     2014                

 

                     [id number]                                                     
     HEP B SURFACE ANTIGEN (RML)                                               
            2014                

 

                     07661                                                     
     HEP C ANTIBODY (RML)                                                      
     2014                

 

                     43161                                                     
     HIV ANTIBODIES (RML)                                                      
     2014                



                                                                               
                                                                               
                    



Results

                                                                    



Encounters

                      





 ACCT No.                    Visit Date/Time                    Discharge      
              Status                    Pt. Type                    Provider   
                 Facility                    Loc./Unit                    
Complaint                

 

 [id number]                    2014 15:11:00                    2014 23:
59:59                    CLS                    Outpatient                    
[facility]                                                          
                     

 

 [id number]                    2014 16:35:00                    2014 23:
59:59                    CLS                    Outpatient                    
[facility]                                                       
                        

 

 [id number]                    2013 16:28:00                    2013 23:
59:59                    CLS                    Outpatient                    
[facility]                                                       
                        

 

 [id number]                    2013 14:38:00                    2013 23:
59:59                    CLS                    Outpatient                    
[facility]                                                         
                      

 

 [id number]                    10/30/2013 14:31:00                    10/30/2013 23:
59:59                    CLS                    Outpatient                    
[facility]                                                         
                      

 

 [id number]                    2013 16:06:00                    2013 23:
59:59                    CLS                    Outpatient                    
[facility]                                                       
                        

 

 [id number]                    2013 15:52:00                    2013 23:
59:59                    CLS                    Outpatient                    
[facility]                                                       
                        

 

 [id number]                    2013 12:45:00                    2013 23:
59:59                    CLS                    Outpatient                    
[facility]                                                       
                        

 

 [id number]                    2012 15:26:00                    2012 23:
59:59                    CLS                    Outpatient

## 2018-04-16 ENCOUNTER — HOSPITAL ENCOUNTER (EMERGENCY)
Dept: HOSPITAL 75 - ER | Age: 23
Discharge: LEFT BEFORE DISCHARGE | End: 2018-04-16
Payer: SELF-PAY

## 2018-04-16 DIAGNOSIS — R10.9: Primary | ICD-10-CM

## 2018-04-16 NOTE — XMS REPORT
Continuity of Care Document

 Created on: 2018



PHILLIP HUSTON

External Reference #: [id number]

: 1995

Sex: Male



Demographics







 Address  [address] [place]  [address]

 

 Home Phone  [phone] x

 

 Preferred Language  Unknown

 

 Marital Status  Unknown

 

 Jehovah's witness Affiliation  Unknown

 

 Race  Unknown

 

 Ethnic Group  Unknown





Author







 Author  [facility]

 

 Address  Unknown

 

 Phone  Unavailable



              



Allergies

      





 Active            Description            Code            Type            
Severity            Reaction            Onset            Reported/Identified   
         Relationship to Patient            Clinical Status        

 

 Yes            NO KNOWN DRUG ALLERGIES                                      
UNKNOWN            NO KNOWN DRUG ALLERG                                 
                          

 

 Yes            No Known Drug Allergies            [id number]            Drug 
Allergy            Unknown            N/A                         2014   
                               



                    



Medications

      





 Medication            Packaging            Start Date            Stop Date    
        Route            Dosage            Sig        

 

             TETANUS,DIPTH,PERT ADULT INJ 0  (ADACEL SYRINGE)                  
    ml            2018                             
                     ONCE&0059                  

 

             AMOXICILLIN  MG (AMOXIL)                      MG            
2018                                               
   ONCE&0059                  



                    



Problems

      





 Date Dx Coded            Attending            Type            Code            
Diagnosis            Diagnosed By        

 

 2010                                      V01.84            
MENINGOCOCCAL VACCINE                     

 

 2010                                      V06.5            DT, TETANUS-
DIPHTHERIA [Td] ,TDAP                     

 

 2010            AMBER GONZALEZ HEBERT LISA                         V01.84 
           MENINGOCOCCAL VACCINE                     

 

 2010            AMBER GONZALEZ HEBERT LISA                         V06.5  
          DT, TETANUS-DIPHTHERIA [Td] ,TDAP                     

 

 2010            AMBER GONZALEZ HEBERT LISA                         V01.84 
           MENINGOCOCCAL VACCINE                     

 

 2010            AMBER GONZALEZ HEBERT LISA                         V06.5  
          DT, TETANUS-DIPHTHERIA [Td] ,TDAP                     

 

 2010            AMBER GONZALEZ HEBERT LISA                         V01.84 
           MENINGOCOCCAL VACCINE                     

 

 2010            AMBER GONZALEZ HEBERT LISA                         V06.5  
          DT, TETANUS-DIPHTHERIA [Td] ,TDAP                     

 

 2010            RAJOTTE APRN, MARTIN A                         V01.84   
         MENINGOCOCCAL VACCINE                     

 

 2010            RAJOTTE APRN, MARTIN A                         V06.5    
        DT, TETANUS-DIPHTHERIA [Td] ,TDAP                     

 

 2010            RAJOTTE APRN, MARTIN A                         V01.84   
         MENINGOCOCCAL VACCINE                     

 

 2010            RAJOTTE APRN, MARTIN A                         V06.5    
        DT, TETANUS-DIPHTHERIA [Td] ,TDAP                     

 

 2010            AMBER GONZALEZ, HEBERT GONZALEZ                         V01.84 
           MENINGOCOCCAL VACCINE                     

 

 2010            AMBER SOARESN, EHBERT GONZALEZ                         V06.5  
          DT, TETANUS-DIPHTHERIA [Td] ,TDAP                     

 

 2010            AMBER SOARESN, HEBERT GONZALEZ                         V01.84 
           MENINGOCOCCAL VACCINE                     

 

 2010            CLARK APRN, HEBERT GONZALEZ                         V06.5  
          DT, TETANUS-DIPHTHERIA [Td] ,TDAP                     

 

 2010            RUFUS APRN, FAWAD R                         V01.84    
        MENINGOCOCCAL VACCINE                     

 

 2010            RUFUS APRN, FAWAD R                         V06.5     
       DT, TETANUS-DIPHTHERIA [Td] ,TDAP                     

 

 10/25/2010                                      313.81            CD 
OPPOSITIONAL DEFIANT                     

 

 10/25/2010                                      314.01            ADHD 
COMBINED                     

 

 10/25/2010            CLARK APRN, HEBERT GONZALEZ                         313.81 
           CD OPPOSITIONAL DEFIANT                     

 

 10/25/2010            CLARK APRN, HEBERT GONZALEZ                         314.01 
           ADHD COMBINED                     

 

 10/25/2010            CLARK APRN, HEBERT GONZALEZ                         313.81 
           CD OPPOSITIONAL DEFIANT                     

 

 10/25/2010            CLARK APRN, HEBERT GONZALEZ                         314.01 
           ADHD COMBINED                     

 

 10/25/2010            CLARK APRN, HEBERT GONZALEZ                         313.81 
           CD OPPOSITIONAL DEFIANT                     

 

 10/25/2010            CLARK APRN, HEBERT GONZALEZ                         314.01 
           ADHD COMBINED                     

 

 10/25/2010            RAJOTTE APRN, MARTIN A                         313.81   
         CD OPPOSITIONAL DEFIANT                     

 

 10/25/2010            RAJOTTE APRN, MARTIN A                         314.01   
         ADHD COMBINED                     

 

 10/25/2010            RAJOTTE APRN, MARTIN A                         313.81   
         CD OPPOSITIONAL DEFIANT                     

 

 10/25/2010            RAJOTTE APRN, MARTIN A                         314.01   
         ADHD COMBINED                     

 

 10/25/2010            CLARK APRN, HEBERT GONZALEZ                         313.81 
           CD OPPOSITIONAL DEFIANT                     

 

 10/25/2010            CLARK APRN, HEBERT GONZALEZ                         314.01 
           ADHD COMBINED                     

 

 10/25/2010            CLARK APRN, HEBERT GONZALEZ                         313.81 
           CD OPPOSITIONAL DEFIANT                     

 

 10/25/2010            CLARK APRN, HEBERT GONZALEZ                         314.01 
           ADHD COMBINED                     

 

 10/25/2010            RUFUS APRN, FAWAD R                         313.81    
        CD OPPOSITIONAL DEFIANT                     

 

 10/25/2010            RUFUS APRN, FAWAD R                         314.01    
        ADHD COMBINED                     

 

 2010                                      296.90            MO MOOD DIS 
NOS                     

 

 2010            AMBER GONZALEZ, HEBERT GONZALEZ                         296.90 
           MO MOOD DIS NOS                     

 

 2010            CLARK APRN, HEBERT GONZALEZ                         296.90 
           MO MOOD DIS NOS                     

 

 2010            CLARK APRN, HEBERT GONZALEZ                         296.90 
           MO MOOD DIS NOS                     

 

 2010            RAJOTTE APRN, MARTIN A                         296.90   
         MO MOOD DIS NOS                     

 

 2010            RAJOTTE APRN, MARTIN A                         296.90   
         MO MOOD DIS NOS                     

 

 2010            CLARK APRN, HEBERT GONZALEZ                         296.90 
           MO MOOD DIS NOS                     

 

 2010            CLARK APRN, HEBERT GONZALEZ                         296.90 
           MO MOOD DIS NOS                     

 

 2010            RUFUS APRN, FAWAD R                         296.90    
        MO MOOD DIS NOS                     

 

 2012            AMBER GONZALEZ, HEBERT GONZALEZ                         307.3  
          TIC DISORDER                     

 

 2012            AMBER APRHEBERT DEL TORO                         V58.69 
           MEDICATION HIGH RISK                     

 

 2012            CLARK APRN, HEBERT GONZALEZ                         307.3  
          TIC DISORDER                     

 

 2012            CLARK APRN, HEBERT GONZALEZ                         V58.69 
           MEDICATION HIGH RISK                     

 

 2012            CLARK APRN, HEBERT GONZALEZ                         307.3  
          TIC DISORDER                     

 

 2012            CLARK APRN, HEBERT GONZALEZ                         V58.69 
           MEDICATION HIGH RISK                     

 

 2012            RAJOTTE APRN, MARTIN A                         307.3    
        TIC DISORDER                     

 

 2012            RAJOTTE APRN, MARTIN A                         V58.69   
         MEDICATION HIGH RISK                     

 

 2012            RAJOTTE APRN, MARTIN A                         307.3    
        TIC DISORDER                     

 

 2012            RAJOTTE APRN, MARTIN A                         V58.69   
         MEDICATION HIGH RISK                     

 

 2012            CLARK APRN, HEBERT GONZALEZ                         307.3  
          TIC DISORDER                     

 

 2012            CLARK APRN, HEBERT GONZALEZ                         V58.69 
           MEDICATION HIGH RISK                     

 

 2012            CLARK APRN, HEBERT GONZALEZ                         307.3  
          TIC DISORDER                     

 

 2012            CLARK APRN, HEBERT GONZALEZ                         V58.69 
           MEDICATION HIGH RISK                     

 

 2012            RUFUS APRN, FAWAD R                         307.3     
       TIC DISORDER                     

 

 2012            RUFUS APRN, FAWAD R                         V58.69    
        MEDICATION HIGH RISK                     

 

 10/30/2013            RAJOTTE APRN, MARTIN A                         V70.3    
        SPORTS PHYSICAL                     

 

 10/30/2013            MARIANN GONZALEZ, MARTIN A                         V70.3    
        SPORTS PHYSICAL                     

 

 10/30/2013            AMBER GONZALEZ, HEBERT GONZALEZ                         V70.3  
          SPORTS PHYSICAL                     

 

 10/30/2013            AMBER GONZALEZ, HEBERT GONZALEZ                         V70.3  
          SPORTS PHYSICAL                     

 

 10/30/2013            RUFUS GONZALEZ, FAWAD R                         V70.3     
       SPORTS PHYSICAL                     

 

 2014            RUFUS APRALVERTO, FAWAD R                         E920.5    
        ACCIDENT - NEEDLESTICK                     

 

 2014            MON[place] CAMPOS            Ot            826.0    
        FX PHALANX, FOOT-CLOSED                     

 

 2014            MON[place] CAMPOS            Ot            959.7    
        LOWER LEG INJURY NOS                     

 

 2014            MON[place] CAMPOS            Ot            E000.8   
         OTHER EXTERNAL CAUSE STATUS                     

 

 2014            MON[place] CAMPOS            Ot            E928.9   
         ACCIDENT NOS                     

 

 2015            BLANCHO DPMLALY            Ot            825.25    
                              

 

 2015            BLANCHO DPMLALY            Ot            826.0     
                             

 

 2015            BLANCHO DPLALY ZAVALETA            Ot            E000.8    
                              

 

 2015            BLANCHO DPLALY ZAVALETA            Ot            E008.1    
                              

 

 2015            BLANCHO DPLALY ZAVALETA            Ot            E927.0    
                              

 

 2015                         Ot            305.00            ALCOHOL 
ABUSE-UNSPEC                     

 

 2015                         Ot            719.41            JOINT PAIN-
SHLDER                     

 

 2015                         Ot            831.04            DISLOC 
ACROMIOCLAVIC-CL                     

 

 2015                         Ot            E029.2            ROUGH 
HOUSING AND HORSEPLAY                     

 

 2015                         Ot            E928.9            ACCIDENT 
NOS                     

 

 2016            BLANCHO DPMLALY            Ot            825.25    
        FX METATARSAL-CLOSED                     

 

 2016            BLANCHO DPLALY ZAVALETA            Ot            826.0     
       FX PHALANX, FOOT-CLOSED                     

 

 2016            BLANCHO DPLALY ZAVALETA            Ot            E000.8    
        OTHER EXTERNAL CAUSE STATUS                     

 

 2016            RONANCHO DPLALY ZAVALETA            Ot            E008.1    
        ACTIVITIES INVOLVING WRESTLING                     

 

 2016            BLANCHO DPMLAYL            Ot            E927.0    
        OVEREXERTION FROM SUDDEN STRENUOUS MOVEM                     

 

 2016            BLANCHO DPMLALY            Ot            825.25    
        FX METATARSAL-CLOSED                     

 

 2016            BLANCHO DPM, LALY CLANCY            Ot            826.0     
       FX PHALANX, FOOT-CLOSED                     

 

 2016            BLANCHO DPM, LALY CLANCY            Ot            E000.8    
        OTHER EXTERNAL CAUSE STATUS                     

 

 2016            BLANCHO DPM, LALY CLANCY            Ot            E008.1    
        ACTIVITIES INVOLVING WRESTLING                     

 

 2016            BLANCHO DPM, LALY CLANCY            Ot            E927.0    
        OVEREXERTION FROM SUDDEN STRENUOUS MOVEM                     

 

 2016            BLANCHO DPM, LALY CLANCY            Ot            825.25    
        FX METATARSAL-CLOSED                     

 

 2016            BLANCHO DPM, LALY CLANCY            Ot            826.0     
       FX PHALANX, FOOT-CLOSED                     

 

 2016            BLANCHO DPM, LALY CLANCY            Ot            E000.8    
        OTHER EXTERNAL CAUSE STATUS                     

 

 2016            BLANCHO DPM, LALY CLANCY            Ot            E008.1    
        ACTIVITIES INVOLVING WRESTLING                     

 

 2016            BLANCHO DPM, LALY CLANCY            Ot            E927.0    
        OVEREXERTION FROM SUDDEN STRENUOUS MOVEM                     

 

 2016            BLANCHO DPM, LALY CLANCY            Ot            825.25    
        FX METATARSAL-CLOSED                     

 

 2016            BLANCHO DPM, LALY CLANCY            Ot            826.0     
       FX PHALANX, FOOT-CLOSED                     

 

 2016            BLANCHO DPM, LALY CLANCY            Ot            E000.8    
        OTHER EXTERNAL CAUSE STATUS                     

 

 2016            BLANCHO DPM, LALY CLANCY            Ot            E008.1    
        ACTIVITIES INVOLVING WRESTLING                     

 

 2016            BLANCHO DPM, LALY CLANCY            Ot            E927.0    
        OVEREXERTION FROM SUDDEN STRENUOUS MOVEM                     

 

 2016            BLANCHO DPM, LALY CLANCY            Ot            825.25    
        FX METATARSAL-CLOSED                     

 

 2016            BLANCHO DPM, LALY CLANCY            Ot            826.0     
       FX PHALANX, FOOT-CLOSED                     

 

 2016            BLANCHO DPM, LALY CLANCY            Ot            E000.8    
        OTHER EXTERNAL CAUSE STATUS                     

 

 2016            BLANCHO DPM, LALY CLANCY            Ot            E008.1    
        ACTIVITIES INVOLVING WRESTLING                     

 

 2016            BLANCHO DPM, LALY CLANCY            Ot            E927.0    
        OVEREXERTION FROM SUDDEN STRENUOUS MOVEM                     

 

 2016            MON[place] CAMPOS            Ot            826.0    
        FX PHALANX, FOOT-CLOSED                     

 

 2016            MON[place] CAMPOS            Ot            959.7    
        LOWER LEG INJURY NOS                     

 

 2016            MON[place] CAMPOS            Ot            E000.8   
         OTHER EXTERNAL CAUSE STATUS                     

 

 2016            [place] CAMPOS            Ot            E928.9   
         ACCIDENT NOS                     

 

 2017            BLANCHO DPM, LALY CLANCY            Ot            825.25    
        FX METATARSAL-CLOSED                     

 

 2017            BLANCHO DPM, LALY CLANCY            Ot            826.0     
       FX PHALANX, FOOT-CLOSED                     

 

 2017            BLANCHO DPM, LLAY CLANCY            Ot            E000.8    
        OTHER EXTERNAL CAUSE STATUS                     

 

 2017            BLANCHO DPM, LALY CLANCY            Ot            E008.1    
        ACTIVITIES INVOLVING WRESTLING                     

 

 2017            RONANCHO DPM, LALY CLANCY            Ot            E927.0    
        OVEREXERTION FROM SUDDEN STRENUOUS MOVEM                     

 

 2017            MAK ENGEL            Ot            S01.112A   
         LACERATION W/O FB OF LEFT EYELID AND PER                     

 

 2017            MAK ENGEL            Ot            W01.0XXA   
         FALL SAME LEV FROM SLIP/TRIP W/O STRIKE                      

 

 2017            MAK ENGEL            Ot            Y92.017    
        GARDEN OR YARD IN SINGLE-FAMILY (PRIVATE                     

 

 2017            MAK ENGEL            Ot            Y93.K1     
       ACTIVITY, WALKING AN ANIMAL                     

 

 2017            MAK ENGEL            Ot            Y99.8      
      OTHER EXTERNAL CAUSE STATUS                     

 

 2017            MAK ENGEL            Ot            S01.112A   
         LACERATION W/O FB OF LEFT EYELID AND PER                     

 

 2017            MAK ENGEL            Ot            W01.0XXA   
         FALL SAME LEV FROM SLIP/TRIP W/O STRIKE                      

 

 2017            MAK ENGEL            Ot            Y92.017    
        GARDEN OR YARD IN SINGLE-FAMILY (PRIVATE                     

 

 2017            MAK ENGEL            Ot            Y93.K1     
       ACTIVITY, WALKING AN ANIMAL                     

 

 2017            MAK ENGEL            Ot            Y99.8      
      OTHER EXTERNAL CAUSE STATUS                     

 

 2017            MANA URRUTIA, MOSES JUNG            Ot            R11.2 
           NAUSEA WITH VOMITING, UNSPECIFIED                     

 

 2017            MOSES ADAIR MD            Ot            Z53.21
            PROC/TRTMT NOT CRD OUT D/T PT LV BEF SEE                     

 

 2017            MANA URRUTIA, MOSES JUNG            Ot            R11.2 
           NAUSEA WITH VOMITING, UNSPECIFIED                     

 

 2017            MANA URRUTIA, MOSES JUNG            Ot            Z53.21
            PROC/TRTMT NOT CRD OUT D/T PT LV BEF SEE                     

 

 2017            MAK ENGEL            Ot            R00.0      
      TACHYCARDIA, UNSPECIFIED                     

 

 2017            MAK ENGEL            Ot            R11.10     
       VOMITING, UNSPECIFIED                     

 

 2018            Mak Engel            873.43            
OPEN WOUND OF LIP, UNCOMPLICATED                     

 

 2018            Mak Engel            S01.511A            
LACERATION WITHOUT FOREIGN BODY OF LIP, INITIAL ENCOUNTER                     



                                                                               
                                                                               
                                                                               
                                             



Procedures

      





 Code            Description            Performed By            Performed On   
     

 

             73606                                  ROUTINE VENIPUNCTURE       
                            2013        

 

             11082                                  CBC                        
           2013        

 

             23777                                  CMP                        
           2013        

 

             32769                                  PSYCH IND W/MED CK 20      
                             2013        

 

             83061                                  VISUAL ACUITY SCREEN       
                            10/30/2013        

 

             58654                                  VISUAL ACUITY SCREEN       
                            2013        

 

             11276                                  ROUTINE VENIPUNCTURE       
                            2014        

 

             31436                                  HEP B SURFACE ANTIGEN (RML)
                                   2014        

 

             75467                                  HEP C ANTIBODY (RML)       
                            2014        

 

             35866                                  HIV ANTIBODIES (RML)       
                            2014        



                                    



Results

      



There is no data.              



Encounters

      





 ACCT No.            Visit Date/Time            Discharge            Status    
        Pt. Type            Provider            Facility            Loc./Unit  
          Complaint        

 

 [id number]            2014 15:11:00            2014 23:59:59          
  [facility]            Outpatient            [facility]                   
                            

 

 [id number]            2014 16:35:00            2014 23:59:59          
  CLS            Outpatient            [facility]                
                               

 

 [id number]            2013 16:28:00            2013 23:59:59          
  CLS            Outpatient            [facility]                
                               

 

 [id number]            2013 14:38:00            2013 23:59:59          
  CLS            Outpatient            [facility]                  
                             

 

 [id number]            10/30/2013 14:31:00            10/30/2013 23:59:59          
  CLS            Outpatient            [facility]                  
                             

 

 [id number]            2013 16:06:00            2013 23:59:59          
  CLS            Outpatient            [facility]                
                               

 

 [id number]            2013 15:52:00            2013 23:59:59          
  CLS            Outpatient            [facility]                
                               

 

 [id number]            2013 12:45:00            2013 23:59:59          
  CLS            Outpatient            [facility]                
                               

 

 [id number]            2012 15:26:00            2012 23:59:59          
  CLS            Outpatient                                                    
        

 

 [id number]            2014 09:41:11                         [facility]            
Document Registration                                                          
  

 

 [id number]            2018 00:36:00            2018 01:40:00          
  DIS            Outpatient            Mak Engel                            
                   

 

 [id number]            2018 01:00:24                                      
Document Registration                                                          
  

 

 [id number]            2017 21:05:00            2017 21:25:00    
        DIS            Outpatient            MAK ENGEL            [facility] 
[facility]            ER            VOMITING        

 

 [id number]            2017 17:54:00            2017 18:37:00    
        DIS            Emergency            MOSES ADAIR MD            
[facility]            ER            VOMITING        

 

 [id number]            2017 20:13:00            2017 20:40:00    
        DIS            Emergency            MAK ENGEL            [facility] 
[facility]            ER            [place]        

 

 [id number]            2014 09:39:00            2014 23:59:59    
        [facility]            Outpatient            BLANCHO LALY MORALES            
[facility]            RAD            LEFT LISFRANC 
FRACTURE        

 

 [id number]            2014 18:20:00            2014 19:48:00    
        DIS            Emergency            [place]AMPOS            
[facility]            ER            FOOT INJ        

 

 [id number]            2015 22:50:00                                   
   Document Registration